# Patient Record
(demographics unavailable — no encounter records)

---

## 2025-05-31 NOTE — PHYSICAL EXAM
[Labia Majora] : normal [Labia Minora] : normal [Normal] : normal [Uterine Adnexae] : normal [FreeTextEntry2] : Esther FNP-BC [FreeTextEntry6] : top normal, mobile

## 2025-05-31 NOTE — PLAN
[FreeTextEntry1] : ca 125 obtained discussed in detail treatment for endometriosis  patient does not desire children. she would be a great candidate for [facility] TLH BS and excision of endometriosis Risks and benefits discussed in detail. see scanned in image patient to follow up for final decision for surgery pending pre op sono  no EMB needed  all of her questions regarding procedure were answered she is agreeable with plan    I Gaviota Guzman [facility]-BC am scribing for the presence of Dr. Cao the following sections HISTORY OF PRESENT ILLNESS, PAST MEDICAL/FAMILY/SOCIAL HISTORY; REVIEW OF SYSTEMS; VITAL SIGNS; PHYSICAL EXAM; DISPOSITION. I personally performed the services described in the documentation, reviewed the documentation recorded by the scribe in my presence and it accurately and completely records my words and actions.

## 2025-05-31 NOTE — HISTORY OF PRESENT ILLNESS
[FreeTextEntry1] : Patient is a 33yo  female here today for initial visit to discuss surgical intervention for endometriosis. has Nexplanon is place   hx of excision of endo- [facility], ELAP/excision of endo-     2019 HSG- patent right tube, obstructed left tube with distal cystic distention/hydrosalpinx 2021 CT abdomen/pelvis-bilateral adnexal cysts possible endometriomas and left hydrosalpinx 2021 MRI pelvis- deep pelvic endometriosis coats posterior uterus and anterior mid to distal rectum, bilateral adnexal endometriosis with left hydrosalpinx

## 2025-05-31 NOTE — PLAN
[FreeTextEntry1] : ca 125 obtained discussed in detail treatment for endometriosis  patient does not desire children. she would be a great candidate for [facility] TLH BS and excision of endometriosis Risks and benefits discussed in detail. see scanned in image patient to follow up for final decision for surgery pending pre op sono  no EMB needed  all of her questions regarding procedure were answered she is agreeable with plan    I Gaviota Guzman [facility]-BC am scribing for the presence of Dr. Cao the following sections HISTORY OF PRESENT ILLNESS, PAST MEDICAL/FAMILY/SOCIAL HISTORY; REVIEW OF SYSTEMS; VITAL SIGNS; PHYSICAL EXAM; DISPOSITION. I personally performed the services described in the documentation, reviewed the documentation recorded by the scribe in my presence and it accurately and completely records my words and actions.

## 2025-05-31 NOTE — HISTORY OF PRESENT ILLNESS
[FreeTextEntry1] : Patient is a 33yo  female here today for initial visit to discuss surgical intervention for endometriosis. has Nexplanon is place   hx of [facility] of endo- [facility], ELAP/excision of endo-     2019 HSG- patent right tube, obstructed left tube with distal cystic distention/hydrosalpinx 2021 CT abdomen/pelvis-bilateral adnexal cysts possible endometriomas and left hydrosalpinx 2021 MRI pelvis- deep pelvic endometriosis coats posterior uterus and anterior mid to distal rectum, bilateral adnexal endometriosis with left hydrosalpinx

## 2025-06-25 NOTE — HEALTH RISK ASSESSMENT
[Monthly or less (1 pt)] : Monthly or less (1 point) [1 or 2 (0 pts)] : 1 or 2 (0 points) [Never (0 pts)] : Never (0 points) [Yes] : In the past 12 months have you used drugs other than those required for medical reasons? Yes [0] : 2) Feeling down, depressed, or hopeless: Not at all (0) [PHQ-2 Negative - No further assessment needed] : PHQ-2 Negative - No further assessment needed [Never] : Never [Employed] : employed [Audit-CScore] : 1 [de-identified] : marijuana [[id number]] : 0

## 2025-06-25 NOTE — REVIEW OF SYSTEMS
[Fever] : no fever [Chills] : no chills [Chest Pain] : no chest pain [Palpitations] : no palpitations [Shortness Of Breath] : no shortness of breath [Wheezing] : no wheezing [Abdominal Pain] : no abdominal pain [Nausea] : no nausea [Diarrhea] : diarrhea [Vomiting] : no vomiting [Headache] : no headache [Dizziness] : no dizziness

## 2025-06-25 NOTE — ASSESSMENT
[Vaccines Reviewed] : Immunizations reviewed today. Please see immunization details in the vaccine log within the immunization flowsheet.  [FreeTextEntry1] : CPE: -will order routine lab work -medical history including surgical history, family history, and current medications reviewed and documented as above -Age appropriate screenings including but not limited to cancer screening, alcohol misuse (audit-c) and depression screening as documented above -patient counseled on healthy diet and lifestyle changes including increasing physical activity and eating a diet low in processed foods and high in fruits and vegetables -counseled patient on age appropriate vaccinations and immunization record updated  HTN- stable will continue amlodipine-olmesartan 5-20mg daily  Vitamin D deficiency will check vitamin d levels  Vitamin B12 deficiency will check b12 levels today

## 2025-06-25 NOTE — PHYSICAL EXAM
[TextEntry] : Constitutional:  no acute distress, well nourished, well developed and well-appearing.  Eyes:  normal sclera/conjunctiva, pupils are equal, round and reactive to light and extraocular movements are intact.  ENT:  the outer ears and nose were normal in appearance and the oropharynx was normal.  Neck:  no jugular venous distention, supple, no lymphadenopathy and the thyroid was normal and there were no nodules present.  Pulmonary:  no respiratory distress, lungs were clear to auscultation bilaterally and no accessory muscle use.  Cardiac:  normal rate, regular rhythm, normal S1 and S2 and no murmur heard.  Vascular:  there was no peripheral edema.  Abdomen:  soft, non-tender, non-distended, no masses palpated, no HSM and normal bowel sounds.  Back:  no CVA or spinal tenderness.  Musculoskeletal:  no joint swelling.  Neurology:  coordination grossly intact, gait normal Psychiatric:  affect was normal and insight and judgment were intact.

## 2025-06-25 NOTE — HISTORY OF PRESENT ILLNESS
[FreeTextEntry1] : cpe [de-identified] : 35 yo F with pmhx of endometriosis, hypertension, antiphospholipid syndrome, cutaneous lupus who presents for cpe. Patient overall doing well. She will be having a hysterectomy soon with Dr. Cao

## 2025-06-27 NOTE — ASSESSMENT
[FreeTextEntry1] : 34 year old female presents today for endometrial biopsy she is scheduled for [facility] TLH BS and excision of endometriosis with appendectomy.  Patients maternal mother had appendiceal cancer.

## 2025-07-07 NOTE — ASSESSMENT
[Patient Optimized for Surgery] : Patient optimized for surgery [FreeTextEntry4] : Preop labs reviewed - cbc, bmp, hgba1c, pt, inr, ptt - wnl EKG: NSR, No ST or T wave abnormalities   Patient to avoid all NSAIDs, fish oil and other herbal supplements 7 days prior to procedure. Patient is to stop aspirin 7 days prior to procedure. Hold amlodipine-olmesartan morning of surgery  For history of antiphospholipid syndrome, recommend resuming aspirin 81 mg daily 24-72 hours postoperatively when hemostasis is secure Recommend considering extended thromboprophylaxis with lovenox for 2-4 weeks postoperatively, to be left to the discretion of surgeon   Patient is low risk for planned procedure with an RCI risk score of 0 and METS >4. Patient is medically optimized for planned procedure with no contraindications.

## 2025-07-07 NOTE — HISTORY OF PRESENT ILLNESS
[FreeTextEntry4] : 35 yo F with pmhx of endometriosis, hypertension, antiphospholipid syndrome, cutaneous lupus who presents for preop eval. Patient will be having a robotic assisted single site total laparoscopic hysterectomy, b/l salpingectomy, excision of endometriosis with Dr. Cao.  Denies chest pain at rest or on exertion, denies shortness of breath. Non-smoker. Has not had adverse reactions to anesthesia in the past. She is able to walk up a flight of stairs without chest pain or dyspnea. Taltz Counseling: I discussed with the patient the risks of ixekizumab including but not limited to immunosuppression, serious infections, worsening of inflammatory bowel disease and drug reactions.  The patient understands that monitoring is required including a PPD at baseline and must alert us or the primary physician if symptoms of infection or other concerning signs are noted.

## 2025-07-07 NOTE — HISTORY OF PRESENT ILLNESS
[FreeTextEntry4] : 33 yo F with pmhx of endometriosis, hypertension, antiphospholipid syndrome, cutaneous lupus who presents for preop eval. Patient will be having a robotic assisted single site total laparoscopic hysterectomy, b/l salpingectomy, excision of endometriosis with Dr. Cao.  Denies chest pain at rest or on exertion, denies shortness of breath. Non-smoker. Has not had adverse reactions to anesthesia in the past. She is able to walk up a flight of stairs without chest pain or dyspnea.

## 2025-07-22 NOTE — HISTORY OF PRESENT ILLNESS
[FreeTextEntry1] : Patient is a 33 yo female here today for final decision for surgery   referred by

## 2025-07-22 NOTE — HISTORY OF PRESENT ILLNESS
[FreeTextEntry1] : Patient is a 35 yo female here today for final decision for surgery   referred by

## 2025-07-22 NOTE — PHYSICAL EXAM
[Appropriately responsive] : appropriately responsive [Alert] : alert [No Acute Distress] : no acute distress [No Lymphadenopathy] : no lymphadenopathy [Regular Rate Rhythm] : regular rate rhythm [No Murmurs] : no murmurs [Clear to Auscultation B/L] : clear to auscultation bilaterally [Soft] : soft [Non-tender] : non-tender [Non-distended] : non-distended [No HSM] : No HSM [No Lesions] : no lesions [No Mass] : no mass [Oriented x3] : oriented x3 [Labia Majora] : normal [Labia Minora] : normal [Normal] : normal [Uterine Adnexae] : normal [FreeTextEntry2] : Esther FNP-BC  [FreeTextEntry6] : top normal, mobile

## 2025-07-22 NOTE — PLAN
[FreeTextEntry1] : plan [facility] BS and excision of endometriosis Discussed pre op and post op instructions in detail zpack and oxycodone #6 ordered  Vaginal restrictions only for 6 weeks. all of patients questions regarding procedure were answered. consent signed by MD, patient and witness she is to f/u with me 2 weeks post operatively she is agreeable with plan.    I Gaviota Guzman [facility]-BC am scribing for the presence of Dr. Cao the following sections HISTORY OF PRESENT ILLNESS, PAST MEDICAL/FAMILY/SOCIAL HISTORY; REVIEW OF SYSTEMS; VITAL SIGNS; PHYSICAL EXAM; DISPOSITION. I personally performed the services described in the documentation, reviewed the documentation recorded by the scribe in my presence and it accurately and completely records my words and actions.

## 2025-07-22 NOTE — PLAN
[FreeTextEntry1] : plan [facility] BS and excision of endometriosis Discussed pre op and post op instructions in detail zpack and oxycodone #6 ordered  Vaginal restrictions only for 6 weeks. all of patients questions regarding procedure were answered. consent signed by MD, patient and witness she is to f/u with me 2 weeks post operatively she is agreeable with plan.    I Gaviota Guzman [facility]-BC am scribing for the presence of Dr. Cao the following sections HISTORY OF PRESENT ILLNESS, PAST MEDICAL/FAMILY/SOCIAL HISTORY; REVIEW OF SYSTEMS; VITAL SIGNS; PHYSICAL EXAM; DISPOSITION. I personally performed the services described in the documentation, reviewed the documentation recorded by the scribe in my presence and it accurately and completely records my words and actions.